# Patient Record
Sex: FEMALE | URBAN - METROPOLITAN AREA
[De-identification: names, ages, dates, MRNs, and addresses within clinical notes are randomized per-mention and may not be internally consistent; named-entity substitution may affect disease eponyms.]

---

## 2018-08-01 ENCOUNTER — TELEPHONE (OUTPATIENT)
Dept: SURGERY | Age: 38
End: 2018-08-01

## 2018-08-01 NOTE — TELEPHONE ENCOUNTER
Shahab Hal was called for an on call visit regarding a healed part of her incision. There was a triangular golf sized ball area. No red, no sign of infection. Pain meds do take the edge off. Pain has increased over the last day and a half. Anmol Goodwin just feels the triangular shape area at the bottom with is harder and she is thinking it may be scar tissue. She just wanted us to be aware due to increased pain. Another part of the incision is red. No fever. May be getting infected.